# Patient Record
Sex: FEMALE | Race: AMERICAN INDIAN OR ALASKA NATIVE
[De-identification: names, ages, dates, MRNs, and addresses within clinical notes are randomized per-mention and may not be internally consistent; named-entity substitution may affect disease eponyms.]

---

## 2020-02-15 ENCOUNTER — HOSPITAL ENCOUNTER (EMERGENCY)
Dept: HOSPITAL 5 - ED | Age: 20
Discharge: HOME | End: 2020-02-15
Payer: SELF-PAY

## 2020-02-15 VITALS — DIASTOLIC BLOOD PRESSURE: 78 MMHG | SYSTOLIC BLOOD PRESSURE: 132 MMHG
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
## 2020-02-15 DIAGNOSIS — S96.911A: ICD-10-CM
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
## 2020-02-15 DIAGNOSIS — F12.10: ICD-10-CM

## 2020-02-15 DIAGNOSIS — Z79.899: ICD-10-CM

## 2020-02-15 DIAGNOSIS — Z98.890: ICD-10-CM

## 2020-02-15 DIAGNOSIS — F17.200: ICD-10-CM
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
## 2020-02-15 DIAGNOSIS — S93.401A: Primary | ICD-10-CM

## 2020-02-15 PROCEDURE — 99284 EMERGENCY DEPT VISIT MOD MDM: CPT

## 2020-02-15 PROCEDURE — 73610 X-RAY EXAM OF ANKLE: CPT

## 2020-02-15 NOTE — EMERGENCY DEPARTMENT REPORT
ED Extremity Problem HPI





- General


Chief complaint: Extremity Injury, Lower


Stated complaint: SWOLLEN,ANKLE


Source: patient


Mode of arrival: Ambulatory


Limitations: No Limitations





- History of Present Illness


Initial comments: 





Patient is a 19-year-old -American female with no past medical history 

who presents to the ED with complaint of acute onset persistent severe right 

ankle and foot pain with swelling after she may have twisted the right ankle and

foot 4 days ago.  Patient states that she has been going to work and bearing 

weight on the right foot and that in the last 24 hours, the swelling and the 

pain have worsened.  Patient denies fall, dizziness, numbness and tingling or 

weakness of lower extremities bilaterally, traumatic injury, low back pain, 

heavy lifting, fever and chills.  Patient states that the pain is worse with 

bearing weight or ambulation.


MD Complaint: extremity pain (right ankle and foot swelling and pain), extremity

swelling (Right ankle and foot), joint swelling (right ankle and foot)


-: Sudden, days(s) (2)


Location: lower extremity (left ankle and foot)


History of Same: No


-: Yes arthralgia, No fever, No associated dyspnea, No associated chest pain


Severity scale (0 -10): 7


Quality: aching, sharp, constant


Consistency: constant


Improves with: nothing


Worsens with: weight bearing, walking, exertion, palpation


Associated Symptoms: denies other symptoms, arthralgias.  denies: chest pain, 

shortness of breath, fever, myalgias, rash





- Related Data


                                  Previous Rx's











 Medication  Instructions  Recorded  Last Taken  Type


 


Ibuprofen [Motrin] 800 mg PO Q8HR PRN #30 tablet 02/15/20 Unknown Rx


 


tiZANidine [Zanaflex 4mg TAB] 4 mg PO Q8H PRN #21 tablet 02/15/20 Unknown Rx


 


traMADoL [Ultram] 50 mg PO Q6HR PRN #12 tablet 02/15/20 Unknown Rx











                                    Allergies











Allergy/AdvReac Type Severity Reaction Status Date / Time


 


No Known Allergies Allergy   Verified 02/15/20 04:51














ED Review of Systems


ROS: 


Stated complaint: SWOLLEN,ANKLE


Other details as noted in HPI





Constitutional: denies: chills, fever


Eyes: denies: eye pain, eye discharge, vision change


ENT: denies: ear pain, throat pain


Respiratory: denies: cough, shortness of breath, wheezing


Cardiovascular: denies: chest pain, palpitations


Endocrine: no symptoms reported


Gastrointestinal: denies: abdominal pain, nausea, diarrhea


Genitourinary: denies: urgency, dysuria, discharge


Musculoskeletal: joint swelling (Left ankle and foot swelling), arthralgia (Left

 ankle and foot pain with swelling), myalgia.  denies: back pain


Skin: denies: rash, lesions


Neurological: denies: headache, weakness, paresthesias


Psychiatric: denies: anxiety, depression


Hematological/Lymphatic: denies: easy bleeding, easy bruising





ED Past Medical Hx





- Past Medical History


Previous Medical History?: No





- Surgical History


Past Surgical History?: Yes


Additional Surgical History: leg





- Social History


Smoking Status: Current Every Day Smoker


Substance Use Type: Alcohol, Marijuana





- Medications


Home Medications: 


                                Home Medications











 Medication  Instructions  Recorded  Confirmed  Last Taken  Type


 


Ibuprofen [Motrin] 800 mg PO Q8HR PRN #30 tablet 02/15/20  Unknown Rx


 


tiZANidine [Zanaflex 4mg TAB] 4 mg PO Q8H PRN #21 tablet 02/15/20  Unknown Rx


 


traMADoL [Ultram] 50 mg PO Q6HR PRN #12 tablet 02/15/20  Unknown Rx














ED Physical Exam





- General


Limitations: No Limitations


General appearance: alert, in no apparent distress





- Head


Head exam: Present: atraumatic, normocephalic, normal inspection





- Eye


Eye exam: Present: normal appearance, PERRL, EOMI


Pupils: Present: normal accommodation





- ENT


ENT exam: Present: normal orophraynx, mucous membranes moist, TM's normal 

bilaterally, normal external ear exam





- Neck


Neck exam: Present: normal inspection, full ROM





- Respiratory


Respiratory exam: Present: normal lung sounds bilaterally.  Absent: respiratory 

distress, wheezes, rales, chest wall tenderness, decreased breath sounds, 

prolonged expiratory





- Cardiovascular


Cardiovascular Exam: Present: regular rate, normal rhythm, normal heart sounds. 

 Absent: systolic murmur, diastolic murmur, rubs, gallop





- GI/Abdominal


GI/Abdominal exam: Present: soft, normal bowel sounds.  Absent: tenderness, 

hyperactive bowel sounds, hypoactive bowel sounds, organomegaly





- Extremities Exam


Extremities exam: Present: normal inspection, tenderness (Palpable right ankle 

and foot tenderness with limited range of motion due to pain), normal capillary 

refill, joint swelling (Right ankle swelling with tenderness).  Absent: full ROM

 (Limited range of motion of right ankle due to pain)





- Back Exam


Back exam: Present: normal inspection, full ROM.  Absent: tenderness, CVA 

tenderness (R), CVA tenderness (L), muscle spasm, paraspinal tenderness





- Neurological Exam


Neurological exam: Present: alert, oriented X3, CN II-XII intact, normal gait, 

reflexes normal





- Psychiatric


Psychiatric exam: Present: normal affect, normal mood





- Skin


Skin exam: Present: warm, dry, intact, normal color.  Absent: rash





ED Course





                                   Vital Signs











  02/15/20 02/15/20





  02:58 05:06


 


Temperature 99.1 F 


 


Pulse Rate 88 


 


Respiratory 20 18





Rate  


 


Blood Pressure 132/78 


 


O2 Sat by Pulse 99 





Oximetry  














ED Medical Decision Making





- Radiology Data


Radiology results: report reviewed, image reviewed





Findings





[facility] 


[address] [id number] 





XRay Report 


Signed 





 Patient: ANGE MCINTYRE MR# 


 : [id number] 


 : 2000 Acct:[id number] 





 Age/Sex: 19 / F ADM Date: 02/15/20 





 Loc: ED 


 Attending Dr: 








 Ordering Physician: DAVID MENDEZ MD 


 Date of Service: 02/15/20 


 Procedure(s): XR ankle 3+V RT 


 Accession Number(s): [id number] 





 cc: ED MD ANDREA 





 Fluoro Time In Minutes: 





 RIGHT ANKLE 3 VIEWS 





INDICATION / CLINICAL INFORMATION: 


pain and swelling 





COMPARISON: 


None available. 





FINDINGS: 





BONES / JOINT(S): No acute fracture or subluxation. No significant arthritis. 


SOFT TISSUES: No significant abnormality. 





ADDITIONAL FINDINGS: None. 











Signer Name: Dru Huerta MD 


Signed: 2/15/2020 3:49 AM 


Workstation Name: [id number] 








 Transcribed By: SS 


 Dictated By: Dru Huerta MD 


 Electronically Authenticated By: Dru Huerta MD 


 Signed Date/Time: 02/15/20 0349 











 DD/DT: 02/15/20 0348 


 TD/TT





- Medical Decision Making





This is a 19-year-old female who presented to the ED with complaint of worsening

 right ankle and foot pain with swelling after twisting her right ankle 4 days 

ago.  In the ED, patient is alert and oriented x3 and is not in distress.  Right

 ankle x-ray shows no acute fractures or subluxations.  Patient was treated for 

pain in the ED and the right ankle was splinted with Ace wrap and patient 

discharged home on pain medications and muscle relaxants.  Patient was advised 

to follow-up with her primary care physician in 5 to 7 days for reevaluation or 

return to the ED immediately if symptoms get worse.





- Differential Diagnosis


right ankle sprain; Right foot muscle strain; ankle fracture


Critical care attestation.: 


If time is entered above; I have spent that time in minutes in the direct care 

of this critically ill patient, excluding procedure time.








ED Disposition


Clinical Impression: 


Severe sprain of right ankle


Qualifiers:


 Encounter type: initial encounter Qualified Code(s): S93.401A - Sprain of 

unspecified ligament of right ankle, initial encounter





Muscle strain of right foot


Qualifiers:


 Encounter type: initial encounter Qualified Code(s): S96.911A - Strain of 

unspecified muscle and tendon at ankle and foot level, right foot, initial 

encounter





Disposition:  TO HOME OR SELFCARE


Is pt being admited?: No


Does the pt Need Aspirin: No


Condition: Stable


Instructions:  Muscle Strain (ED), Ankle Sprain (ED)


Additional Instructions: 


The right ankle x-ray shows no acute fractures or subluxations.  The pain is 

likely from ligament injuries and inflammation of the right ankle joint and 

foot.  Therefore take medications with food, drink plenty of fluids and follow-

up with your primary care physician in 7 to 10 days for reevaluation.  Return to

 the ED immediately if symptoms get worse.


Prescriptions: 


Ibuprofen [Motrin] 800 mg PO Q8HR PRN #30 tablet


 PRN Reason: Pain , Severe (7-10)


traMADoL [Ultram] 50 mg PO Q6HR PRN #12 tablet


 PRN Reason: Pain


tiZANidine [Zanaflex 4mg TAB] 4 mg PO Q8H PRN #21 tablet


 PRN Reason: Muscle Spasm


Referrals: 


[facility] [Outside] - 3-5 Days


Forms:  Work/School Release Form(ED)


Time of Disposition: 05:23


Print Language: ENGLISH

## 2020-02-15 NOTE — XRAY REPORT
RIGHT ANKLE 3 VIEWS



INDICATION / CLINICAL INFORMATION:

pain and swelling



COMPARISON:

None available.

 

FINDINGS:



BONES / JOINT(S): No acute fracture or subluxation. No significant arthritis.

SOFT TISSUES: No significant abnormality.



ADDITIONAL FINDINGS: None.







Signer Name: Dru Huerta MD 

Signed: 2/15/2020 3:49 AM

 Workstation Name: [id number]